# Patient Record
Sex: FEMALE | Race: BLACK OR AFRICAN AMERICAN | NOT HISPANIC OR LATINO | URBAN - METROPOLITAN AREA
[De-identification: names, ages, dates, MRNs, and addresses within clinical notes are randomized per-mention and may not be internally consistent; named-entity substitution may affect disease eponyms.]

---

## 2022-07-15 ENCOUNTER — WEB ENCOUNTER (OUTPATIENT)
Dept: URBAN - METROPOLITAN AREA CLINIC 92 | Facility: CLINIC | Age: 57
End: 2022-07-15

## 2022-07-21 ENCOUNTER — OFFICE VISIT (OUTPATIENT)
Dept: URBAN - METROPOLITAN AREA CLINIC 92 | Facility: CLINIC | Age: 57
End: 2022-07-21
Payer: COMMERCIAL

## 2022-07-21 VITALS
BODY MASS INDEX: 30.8 KG/M2 | WEIGHT: 220 LBS | HEIGHT: 71 IN | SYSTOLIC BLOOD PRESSURE: 149 MMHG | HEART RATE: 96 BPM | DIASTOLIC BLOOD PRESSURE: 97 MMHG | TEMPERATURE: 97.1 F

## 2022-07-21 DIAGNOSIS — I69.30 HISTORY OF CVA WITH RESIDUAL DEFICIT: ICD-10-CM

## 2022-07-21 DIAGNOSIS — R13.19 ESOPHAGEAL DYSPHAGIA: ICD-10-CM

## 2022-07-21 DIAGNOSIS — Z12.11 COLON CANCER SCREENING: ICD-10-CM

## 2022-07-21 DIAGNOSIS — K21.9 GERD WITHOUT ESOPHAGITIS: ICD-10-CM

## 2022-07-21 PROBLEM — 440140008: Status: ACTIVE | Noted: 2022-07-21

## 2022-07-21 PROCEDURE — 99204 OFFICE O/P NEW MOD 45 MIN: CPT | Performed by: INTERNAL MEDICINE

## 2022-07-21 RX ORDER — GABAPENTIN 300 MG/1
CAPSULE ORAL
Qty: 0 | Refills: 0 | Status: ON HOLD | COMMUNITY
Start: 1900-01-01

## 2022-07-21 RX ORDER — PRAVASTATIN SODIUM 40 MG/1
TABLET ORAL
Qty: 0 | Refills: 0 | Status: ACTIVE | COMMUNITY
Start: 1900-01-01

## 2022-07-21 RX ORDER — FAMOTIDINE 40 MG/1
1 TABLET AT BEDTIME TABLET, FILM COATED ORAL ONCE A DAY
Qty: 30 | Refills: 2 | OUTPATIENT
Start: 2022-07-21

## 2022-07-21 RX ORDER — DOXAZOSIN MESYLATE 4 MG/1
1 TABLET TABLET ORAL ONCE A DAY
Status: ACTIVE | COMMUNITY

## 2022-07-21 RX ORDER — PROCHLORPERAZINE 25 MG/1
_INSERT 1 SUPPOSITORY (25 MG) BY RECTAL ROUTE 2 TIMES PER DAY SUPPOSITORY RECTAL 2
Qty: 0 | Refills: 0 | Status: ON HOLD | COMMUNITY
Start: 1900-01-01

## 2022-07-21 RX ORDER — SODIUM, POTASSIUM,MAG SULFATES 17.5-3.13G
354 ML SOLUTION, RECONSTITUTED, ORAL ORAL
Qty: 354 ML | Refills: 0 | OUTPATIENT
Start: 2022-07-21 | End: 2022-07-22

## 2022-07-21 NOTE — HPI-TODAY'S VISIT:
This is a 57 yo referred by Dr. Jose Phillips for a colonoscopy.  A copy of this document will be sent to the referring provider. The patient denies abdominal pain, weight loss, rectal bleeding, constipation, diarrhea, and a change in bowel habits.  There is no family history of colon cancer or colon polyps.  Patient is on plavix for  CVA 2010.  The patient admits to GERD symptoms a few times a week controlled with peptobismol.  She is not on a PPI.  She reports occasional dysphagia to solids.   She also admits to nausea and early satiety but denies vomiting.  She denies weight loss.

## 2022-09-23 ENCOUNTER — OUT OF OFFICE VISIT (OUTPATIENT)
Dept: URBAN - METROPOLITAN AREA MEDICAL CENTER 18 | Facility: MEDICAL CENTER | Age: 57
End: 2022-09-23
Payer: COMMERCIAL

## 2022-09-23 DIAGNOSIS — K83.8 ACQUIRED DILATION OF BILE DUCT: ICD-10-CM

## 2022-09-23 DIAGNOSIS — Z90.49 ABSENCE OF GALLBLADDER: ICD-10-CM

## 2022-09-23 DIAGNOSIS — R10.84 ABDOMINAL CRAMPING, GENERALIZED: ICD-10-CM

## 2022-09-23 DIAGNOSIS — K91.89 BILE LEAK, POSTOPERATIVE: ICD-10-CM

## 2022-09-23 DIAGNOSIS — K91.89 AFFERENT LOOP SYNDROME: ICD-10-CM

## 2022-09-23 PROCEDURE — 99232 SBSQ HOSP IP/OBS MODERATE 35: CPT | Performed by: INTERNAL MEDICINE

## 2022-09-23 PROCEDURE — 99222 1ST HOSP IP/OBS MODERATE 55: CPT | Performed by: INTERNAL MEDICINE

## 2022-09-23 PROCEDURE — 43274 ERCP DUCT STENT PLACEMENT: CPT | Performed by: INTERNAL MEDICINE

## 2022-09-23 PROCEDURE — G8427 DOCREV CUR MEDS BY ELIG CLIN: HCPCS | Performed by: INTERNAL MEDICINE

## 2022-09-25 ENCOUNTER — OUT OF OFFICE VISIT (OUTPATIENT)
Dept: URBAN - METROPOLITAN AREA MEDICAL CENTER 18 | Facility: MEDICAL CENTER | Age: 57
End: 2022-09-25
Payer: COMMERCIAL

## 2022-09-25 DIAGNOSIS — Z90.49 ABSENCE OF GALLBLADDER: ICD-10-CM

## 2022-09-25 DIAGNOSIS — K83.8 ACQUIRED DILATION OF BILE DUCT: ICD-10-CM

## 2022-09-25 DIAGNOSIS — R10.84 ABDOMINAL CRAMPING, GENERALIZED: ICD-10-CM

## 2022-09-25 DIAGNOSIS — K91.89 AFFERENT LOOP SYNDROME: ICD-10-CM

## 2022-09-25 PROCEDURE — 99232 SBSQ HOSP IP/OBS MODERATE 35: CPT | Performed by: PHYSICIAN ASSISTANT

## 2022-09-27 ENCOUNTER — OUT OF OFFICE VISIT (OUTPATIENT)
Dept: URBAN - METROPOLITAN AREA MEDICAL CENTER 18 | Facility: MEDICAL CENTER | Age: 57
End: 2022-09-27
Payer: COMMERCIAL

## 2022-09-27 DIAGNOSIS — K83.8 ACQUIRED DILATION OF BILE DUCT: ICD-10-CM

## 2022-09-27 DIAGNOSIS — R10.84 ABDOMINAL CRAMPING, GENERALIZED: ICD-10-CM

## 2022-09-27 PROCEDURE — 99232 SBSQ HOSP IP/OBS MODERATE 35: CPT | Performed by: PHYSICIAN ASSISTANT

## 2022-10-01 ENCOUNTER — OUT OF OFFICE VISIT (OUTPATIENT)
Dept: URBAN - METROPOLITAN AREA MEDICAL CENTER 18 | Facility: MEDICAL CENTER | Age: 57
End: 2022-10-01
Payer: COMMERCIAL

## 2022-10-01 DIAGNOSIS — K91.89 AFFERENT LOOP SYNDROME: ICD-10-CM

## 2022-10-01 DIAGNOSIS — K83.8 ACQUIRED DILATION OF BILE DUCT: ICD-10-CM

## 2022-10-01 PROCEDURE — 99232 SBSQ HOSP IP/OBS MODERATE 35: CPT | Performed by: INTERNAL MEDICINE

## 2022-10-01 PROCEDURE — 99231 SBSQ HOSP IP/OBS SF/LOW 25: CPT | Performed by: INTERNAL MEDICINE

## 2022-10-10 ENCOUNTER — OUT OF OFFICE VISIT (OUTPATIENT)
Dept: URBAN - METROPOLITAN AREA MEDICAL CENTER 18 | Facility: MEDICAL CENTER | Age: 57
End: 2022-10-10
Payer: COMMERCIAL

## 2022-10-10 DIAGNOSIS — K83.8 ACQUIRED DILATION OF BILE DUCT: ICD-10-CM

## 2022-10-10 DIAGNOSIS — Z90.49 ABSENCE OF GALLBLADDER: ICD-10-CM

## 2022-10-10 PROCEDURE — 99232 SBSQ HOSP IP/OBS MODERATE 35: CPT | Performed by: PHYSICIAN ASSISTANT

## 2022-10-20 ENCOUNTER — P2P PATIENT RECORD (OUTPATIENT)
Age: 57
End: 2022-10-20

## 2022-10-21 ENCOUNTER — TELEPHONE ENCOUNTER (OUTPATIENT)
Dept: URBAN - METROPOLITAN AREA CLINIC 92 | Facility: CLINIC | Age: 57
End: 2022-10-21

## 2022-10-24 ENCOUNTER — TELEPHONE ENCOUNTER (OUTPATIENT)
Dept: URBAN - METROPOLITAN AREA CLINIC 128 | Facility: CLINIC | Age: 57
End: 2022-10-24

## 2022-11-03 ENCOUNTER — OFFICE VISIT (OUTPATIENT)
Dept: URBAN - METROPOLITAN AREA SURGERY CENTER 16 | Facility: SURGERY CENTER | Age: 57
End: 2022-11-03

## 2022-11-22 ENCOUNTER — P2P PATIENT RECORD (OUTPATIENT)
Age: 57
End: 2022-11-22

## 2022-11-22 ENCOUNTER — TELEPHONE ENCOUNTER (OUTPATIENT)
Dept: URBAN - METROPOLITAN AREA CLINIC 92 | Facility: CLINIC | Age: 57
End: 2022-11-22

## 2022-12-02 ENCOUNTER — TELEPHONE ENCOUNTER (OUTPATIENT)
Dept: URBAN - METROPOLITAN AREA CLINIC 92 | Facility: CLINIC | Age: 57
End: 2022-12-02

## 2022-12-05 ENCOUNTER — TELEPHONE ENCOUNTER (OUTPATIENT)
Dept: URBAN - METROPOLITAN AREA CLINIC 128 | Facility: CLINIC | Age: 57
End: 2022-12-05

## 2022-12-07 ENCOUNTER — OFFICE VISIT (OUTPATIENT)
Dept: URBAN - METROPOLITAN AREA CLINIC 128 | Facility: CLINIC | Age: 57
End: 2022-12-07
Payer: COMMERCIAL

## 2022-12-07 ENCOUNTER — WEB ENCOUNTER (OUTPATIENT)
Dept: URBAN - METROPOLITAN AREA CLINIC 128 | Facility: CLINIC | Age: 57
End: 2022-12-07

## 2022-12-07 VITALS
HEART RATE: 89 BPM | HEIGHT: 71 IN | DIASTOLIC BLOOD PRESSURE: 88 MMHG | BODY MASS INDEX: 26.88 KG/M2 | WEIGHT: 192 LBS | SYSTOLIC BLOOD PRESSURE: 139 MMHG | TEMPERATURE: 97.7 F

## 2022-12-07 DIAGNOSIS — K83.8 BILE DUCT LEAK: ICD-10-CM

## 2022-12-07 LAB
A/G RATIO: 1.5
ALBUMIN: 4.1
ALKALINE PHOSPHATASE: 84
ALT (SGPT): 13
AST (SGOT): 11
BILIRUBIN, TOTAL: <0.2
BUN/CREATININE RATIO: 9
BUN: 6
CALCIUM: 9
CARBON DIOXIDE, TOTAL: 19
CHLORIDE: 110
CREATININE: 0.66
EGFR: 103
GLOBULIN, TOTAL: 2.7
GLUCOSE: 74
POTASSIUM: 3.9
PROTEIN, TOTAL: 6.8
SODIUM: 141

## 2022-12-07 PROCEDURE — 99214 OFFICE O/P EST MOD 30 MIN: CPT | Performed by: INTERNAL MEDICINE

## 2022-12-07 RX ORDER — PRAVASTATIN SODIUM 40 MG/1
TABLET ORAL
Qty: 0 | Refills: 0 | Status: DISCONTINUED | COMMUNITY
Start: 1900-01-01

## 2022-12-07 RX ORDER — PROCHLORPERAZINE 25 MG/1
_INSERT 1 SUPPOSITORY (25 MG) BY RECTAL ROUTE 2 TIMES PER DAY SUPPOSITORY RECTAL 2
Qty: 0 | Refills: 0 | Status: DISCONTINUED | COMMUNITY
Start: 1900-01-01

## 2022-12-07 RX ORDER — FAMOTIDINE 40 MG/1
1 TABLET AT BEDTIME TABLET, FILM COATED ORAL ONCE A DAY
Qty: 30 | Refills: 2 | Status: DISCONTINUED | COMMUNITY
Start: 2022-07-21

## 2022-12-07 RX ORDER — DOXAZOSIN MESYLATE 4 MG/1
1 TABLET TABLET ORAL ONCE A DAY
Status: DISCONTINUED | COMMUNITY

## 2022-12-07 RX ORDER — GABAPENTIN 300 MG/1
CAPSULE ORAL
Qty: 0 | Refills: 0 | Status: DISCONTINUED | COMMUNITY
Start: 1900-01-01

## 2022-12-07 NOTE — HPI-TODAY'S VISIT:
seen in september for bile duct injury during ccy. stent and sphincterotmy placed. reports intermittent abd pain. seen in [facility] ER november, labs unremarkable ct showing drains removed with resolution of fluid collections, biliary duct dialtion with pneumobilia and plast stent, o/w pema. labs yesterday unreamrkable. on eliquis.

## 2022-12-07 NOTE — PHYSICAL EXAM GASTROINTESTINAL
Abdomen: soft mild epigast tenderneess.non-distended. No hepatosplenomegaly. no guarding or rebound. Rectal: defered.

## 2022-12-15 ENCOUNTER — TELEPHONE ENCOUNTER (OUTPATIENT)
Dept: URBAN - METROPOLITAN AREA CLINIC 128 | Facility: CLINIC | Age: 57
End: 2022-12-15

## 2022-12-19 PROBLEM — 235932003: Status: ACTIVE | Noted: 2022-12-07

## 2022-12-23 ENCOUNTER — WEB ENCOUNTER (OUTPATIENT)
Dept: URBAN - METROPOLITAN AREA CLINIC 92 | Facility: CLINIC | Age: 57
End: 2022-12-23

## 2022-12-23 ENCOUNTER — TELEPHONE ENCOUNTER (OUTPATIENT)
Dept: URBAN - METROPOLITAN AREA CLINIC 128 | Facility: CLINIC | Age: 57
End: 2022-12-23

## 2022-12-29 ENCOUNTER — OFFICE VISIT (OUTPATIENT)
Dept: URBAN - METROPOLITAN AREA CLINIC 92 | Facility: CLINIC | Age: 57
End: 2022-12-29
Payer: COMMERCIAL

## 2022-12-29 VITALS
HEIGHT: 71 IN | WEIGHT: 198 LBS | HEART RATE: 94 BPM | DIASTOLIC BLOOD PRESSURE: 95 MMHG | BODY MASS INDEX: 27.72 KG/M2 | SYSTOLIC BLOOD PRESSURE: 150 MMHG | TEMPERATURE: 97.1 F

## 2022-12-29 DIAGNOSIS — Z86.711 HISTORY OF PULMONARY ARTERY THROMBOSIS: ICD-10-CM

## 2022-12-29 DIAGNOSIS — K21.9 GERD: ICD-10-CM

## 2022-12-29 DIAGNOSIS — Z12.11 COLON CANCER SCREENING: ICD-10-CM

## 2022-12-29 DIAGNOSIS — R13.10 DYSPHAGIA: ICD-10-CM

## 2022-12-29 PROBLEM — 16833281000119106: Status: ACTIVE | Noted: 2022-12-29

## 2022-12-29 PROCEDURE — 99214 OFFICE O/P EST MOD 30 MIN: CPT | Performed by: INTERNAL MEDICINE

## 2022-12-29 RX ORDER — SODIUM PICOSULFATE, MAGNESIUM OXIDE, AND ANHYDROUS CITRIC ACID 10; 3.5; 12 MG/160ML; G/160ML; G/160ML
AS DIRECTED LIQUID ORAL
OUTPATIENT
Start: 2022-12-29

## 2022-12-29 NOTE — HPI-OTHER HISTORIES
(July 2022) This is a 55 yo referred by Dr. Jose Phillips for a colonoscopy.  A copy of this document will be sent to the referring provider. The patient denies abdominal pain, weight loss, rectal bleeding, constipation, diarrhea, and a change in bowel habits.  There is no family history of colon cancer or colon polyps.  Patient is on plavix for  CVA 2010.  The patient admits to GERD symptoms a few times a week controlled with peptobismol.  She is not on a PPI.  She reports occasional dysphagia to solids.   She also admits to nausea and early satiety but denies vomiting.  She denies weight loss.  ([place]:  December 2022) seen in september for bile duct injury during ccy. stent and sphincterotmy placed. reports intermittent abd pain. seen in [facility] ER november, labs unremarkable ct showing drains removed with resolution of fluid collections, biliary duct dialtion with pneumobilia and plast stent, o/w pema. labs yesterday unreamrkable. on eliquis.

## 2022-12-29 NOTE — HPI-TODAY'S VISIT:
This is a 58 yo referred by Dr. Jose Phillpis for a colonoscopy.  A copy of this document will be sent to the referring provider. The patient denies abdominal pain, weight loss, rectal bleeding, constipation, diarrhea, and a change in bowel habits.  There is no family history of colon cancer or colon polyps.  Patient is on plavix for  CVA 2010.  The patient admits to GERD symptoms a few times a week controlled with peptobismol.  She is not on a PPI.  She reports occasional dysphagia to solids.   She also admits to nausea and early satiety but denies vomiting.  She denies weight loss.

## 2022-12-29 NOTE — PHYSICAL EXAM RECTAL:
normal tone, no external hemorrhoids, no masses palpable, no red blood, Tenderness on KIRBY, Internal hemorrhoids present

## 2023-01-03 ENCOUNTER — TELEPHONE ENCOUNTER (OUTPATIENT)
Dept: URBAN - METROPOLITAN AREA CLINIC 128 | Facility: CLINIC | Age: 58
End: 2023-01-03

## 2023-01-05 ENCOUNTER — LAB OUTSIDE AN ENCOUNTER (OUTPATIENT)
Dept: URBAN - METROPOLITAN AREA CLINIC 92 | Facility: CLINIC | Age: 58
End: 2023-01-05

## 2023-01-06 ENCOUNTER — OFFICE VISIT (OUTPATIENT)
Dept: URBAN - METROPOLITAN AREA MEDICAL CENTER 18 | Facility: MEDICAL CENTER | Age: 58
End: 2023-01-06
Payer: COMMERCIAL

## 2023-01-06 DIAGNOSIS — Z46.59 ENCOUNTER FOR CARE RELATED TO FEEDING TUBE: ICD-10-CM

## 2023-01-06 PROCEDURE — 43275 ERCP REMOVE FORGN BODY DUCT: CPT | Performed by: INTERNAL MEDICINE

## 2023-01-26 PROBLEM — 40739000 DYSPHAGIA: Status: ACTIVE | Noted: 2022-12-29

## 2023-01-26 PROBLEM — 266435005: Status: ACTIVE | Noted: 2022-07-21

## 2023-01-26 PROBLEM — 275978004 COLON CANCER SCREENING: Status: ACTIVE | Noted: 2023-01-26

## 2023-02-02 ENCOUNTER — OFFICE VISIT (OUTPATIENT)
Dept: URBAN - METROPOLITAN AREA CLINIC 128 | Facility: CLINIC | Age: 58
End: 2023-02-02

## 2023-02-27 ENCOUNTER — TELEPHONE ENCOUNTER (OUTPATIENT)
Dept: URBAN - METROPOLITAN AREA CLINIC 92 | Facility: CLINIC | Age: 58
End: 2023-02-27

## 2023-02-27 RX ORDER — SODIUM PICOSULFATE, MAGNESIUM OXIDE, AND ANHYDROUS CITRIC ACID 10; 3.5; 12 MG/160ML; G/160ML; G/160ML
AS DIRECTED LIQUID ORAL
Start: 2022-12-29

## 2023-03-01 ENCOUNTER — TELEPHONE ENCOUNTER (OUTPATIENT)
Dept: URBAN - METROPOLITAN AREA CLINIC 92 | Facility: CLINIC | Age: 58
End: 2023-03-01

## 2023-03-01 RX ORDER — POLYETHYLENE GLYCOL 3350, SODIUM CHLORIDE, SODIUM BICARBONATE, POTASSIUM CHLORIDE 420; 11.2; 5.72; 1.48 G/4L; G/4L; G/4L; G/4L
AS DIRECTED POWDER, FOR SOLUTION ORAL
OUTPATIENT
Start: 2023-03-02

## 2023-03-01 RX ORDER — SODIUM PICOSULFATE, MAGNESIUM OXIDE, AND ANHYDROUS CITRIC ACID 10; 3.5; 12 MG/160ML; G/160ML; G/160ML
AS DIRECTED LIQUID ORAL
OUTPATIENT
Start: 2022-12-29

## 2023-03-03 ENCOUNTER — TELEPHONE ENCOUNTER (OUTPATIENT)
Dept: URBAN - METROPOLITAN AREA CLINIC 92 | Facility: CLINIC | Age: 58
End: 2023-03-03

## 2023-03-06 ENCOUNTER — OFFICE VISIT (OUTPATIENT)
Dept: URBAN - METROPOLITAN AREA MEDICAL CENTER 12 | Facility: MEDICAL CENTER | Age: 58
End: 2023-03-06
Payer: COMMERCIAL

## 2023-03-06 DIAGNOSIS — K31.89 ACQUIRED DEFORMITY OF DUODENUM: ICD-10-CM

## 2023-03-06 DIAGNOSIS — R13.19 CERVICAL DYSPHAGIA: ICD-10-CM

## 2023-03-06 DIAGNOSIS — Z86.010 ADENOMAS PERSONAL HISTORY OF COLONIC POLYPS: ICD-10-CM

## 2023-03-06 PROCEDURE — 43450 DILATE ESOPHAGUS 1/MULT PASS: CPT | Performed by: INTERNAL MEDICINE

## 2023-03-06 PROCEDURE — 45378 DIAGNOSTIC COLONOSCOPY: CPT | Performed by: INTERNAL MEDICINE

## 2023-03-06 PROCEDURE — 43239 EGD BIOPSY SINGLE/MULTIPLE: CPT | Performed by: INTERNAL MEDICINE

## 2023-03-06 RX ORDER — POLYETHYLENE GLYCOL 3350, SODIUM CHLORIDE, SODIUM BICARBONATE, POTASSIUM CHLORIDE 420; 11.2; 5.72; 1.48 G/4L; G/4L; G/4L; G/4L
AS DIRECTED POWDER, FOR SOLUTION ORAL
Status: ACTIVE | COMMUNITY
Start: 2023-03-02

## 2023-03-09 ENCOUNTER — TELEPHONE ENCOUNTER (OUTPATIENT)
Dept: URBAN - METROPOLITAN AREA CLINIC 92 | Facility: CLINIC | Age: 58
End: 2023-03-09

## 2023-03-14 ENCOUNTER — LAB OUTSIDE AN ENCOUNTER (OUTPATIENT)
Dept: URBAN - METROPOLITAN AREA CLINIC 128 | Facility: CLINIC | Age: 58
End: 2023-03-14

## 2023-03-21 LAB
ADENOVIRUS F 40/41: NOT DETECTED
CALPROTECTIN, STOOL - QDX: (no result)
CAMPYLOBACTER: NOT DETECTED
CLOSTRIDIUM DIFFICILE: NOT DETECTED
CRYPTOSPORIDIUM: NOT DETECTED
CYCLOSPORA CAYETANESIS: NOT DETECTED
E. COLI O157: (no result)
ENTAMOEBA HISTOLYTICA: NOT DETECTED
ENTAMOEBA HISTOLYTICA: NOT DETECTED
ENTEROAGGREGATIVE E.COLI: NOT DETECTED
ENTEROTOXIGENIC E.COLI: NOT DETECTED
ESCHERICHIA COLI O157: NOT DETECTED
GIARDIA LAMBIA: NOT DETECTED
NOROVIRUS GI/GII: NOT DETECTED
NOROVIRUS GI/GII: NOT DETECTED
PANCREATICELASTASE ELISA, STOOL: (no result)
ROTAVIRUS A: NOT DETECTED
SHIGA-LIKE TOXIN PRODUCING E.COLI: NOT DETECTED
SHIGELLA SPP. / ENTEROINVASIVE E.COLI: NOT DETECTED
VIBRIO CHOLERAE: NOT DETECTED
VIBRIO PARAHAEMOLYTICUS: NOT DETECTED
VIBRIO SPP.: NOT DETECTED
YERSINIA ENTEROCOLITICA: NOT DETECTED
YERSINIA ENTEROCOLITICA: NOT DETECTED

## 2023-04-11 ENCOUNTER — TELEPHONE ENCOUNTER (OUTPATIENT)
Dept: URBAN - METROPOLITAN AREA CLINIC 92 | Facility: CLINIC | Age: 58
End: 2023-04-11

## 2023-04-11 RX ORDER — SCOPOLAMINE 1 MG/3D
1 PATCH TO SKIN BEHIND THE EAR AS NEEDED PATCH TRANSDERMAL
Qty: 9 | OUTPATIENT
Start: 2023-04-13 | End: 2023-05-13

## 2023-04-26 ENCOUNTER — OFFICE VISIT (OUTPATIENT)
Dept: URBAN - METROPOLITAN AREA TELEHEALTH 2 | Facility: TELEHEALTH | Age: 58
End: 2023-04-26

## 2023-04-26 ENCOUNTER — CLAIMS CREATED FROM THE CLAIM WINDOW (OUTPATIENT)
Dept: URBAN - METROPOLITAN AREA TELEHEALTH 2 | Facility: TELEHEALTH | Age: 58
End: 2023-04-26
Payer: COMMERCIAL

## 2023-04-26 ENCOUNTER — CLAIMS CREATED FROM THE CLAIM WINDOW (OUTPATIENT)
Dept: URBAN - METROPOLITAN AREA TELEHEALTH 2 | Facility: TELEHEALTH | Age: 58
End: 2023-04-26

## 2023-04-26 ENCOUNTER — TELEPHONE ENCOUNTER (OUTPATIENT)
Dept: URBAN - METROPOLITAN AREA CLINIC 35 | Facility: CLINIC | Age: 58
End: 2023-04-26

## 2023-04-26 VITALS — WEIGHT: 196 LBS | BODY MASS INDEX: 27.44 KG/M2 | HEIGHT: 71 IN

## 2023-04-26 DIAGNOSIS — K21.9 GERD: ICD-10-CM

## 2023-04-26 DIAGNOSIS — R11.2 NAUSEA AND VOMITING, UNSPECIFIED VOMITING TYPE: ICD-10-CM

## 2023-04-26 DIAGNOSIS — R19.7 DIARRHEA, UNSPECIFIED TYPE: ICD-10-CM

## 2023-04-26 DIAGNOSIS — K86.89 PANCREATIC INSUFFICIENCY: ICD-10-CM

## 2023-04-26 DIAGNOSIS — R13.10 DYSPHAGIA: ICD-10-CM

## 2023-04-26 PROCEDURE — 99213 OFFICE O/P EST LOW 20 MIN: CPT | Performed by: INTERNAL MEDICINE

## 2023-04-26 RX ORDER — ONDANSETRON 4 MG/1
1 TABLET ON THE TONGUE AND ALLOW TO DISSOLVE TABLET, ORALLY DISINTEGRATING ORAL ONCE A DAY
Qty: 30 | OUTPATIENT
Start: 2023-04-26

## 2023-04-26 RX ORDER — PANCRELIPASE 36000; 180000; 114000 [USP'U]/1; [USP'U]/1; [USP'U]/1
THREE TABLETS WITH MEALS AND ONE TABLET WITH A SNACK CAPSULE, DELAYED RELEASE PELLETS ORAL THREE TIMES A DAY
Qty: 270 | Refills: 2 | OUTPATIENT
Start: 2023-04-26 | End: 2023-07-25

## 2023-04-26 RX ORDER — POLYETHYLENE GLYCOL 3350, SODIUM CHLORIDE, SODIUM BICARBONATE, POTASSIUM CHLORIDE 420; 11.2; 5.72; 1.48 G/4L; G/4L; G/4L; G/4L
AS DIRECTED POWDER, FOR SOLUTION ORAL
Status: ACTIVE | COMMUNITY
Start: 2023-03-02

## 2023-04-26 NOTE — HPI-OTHER HISTORIES
(July 2022) This is a 57 yo referred by Dr. Jose Phillips for a colonoscopy.  A copy of this document will be sent to the referring provider. The patient denies abdominal pain, weight loss, rectal bleeding, constipation, diarrhea, and a change in bowel habits.  There is no family history of colon cancer or colon polyps.  Patient is on plavix for  CVA 2010.  The patient admits to GERD symptoms a few times a week controlled with peptobismol.  She is not on a PPI.  She reports occasional dysphagia to solids.   She also admits to nausea and early satiety but denies vomiting.  She denies weight loss.  ([place]:  December 2022) seen in september for bile duct injury during ccy. stent and sphincterotmy placed. reports intermittent abd pain. seen in [facility] ER november, labs unremarkable ct showing drains removed with resolution of fluid collections, biliary duct dialtion with pneumobilia and plast stent, o/w pema. labs yesterday unreamrkable. on eliquis.  (December 2022) This is a 58 yo referred by Dr. Jose Phillips for a colonoscopy.  A copy of this document will be sent to the referring provider. The patient denies abdominal pain, weight loss, rectal bleeding, constipation, diarrhea, and a change in bowel habits.  There is no family history of colon cancer or colon polyps.  Patient is on plavix for  CVA 2010.  The patient admits to GERD symptoms a few times a week controlled with peptobismol.  She is not on a PPI.  She reports occasional dysphagia to solids.   She also admits to nausea and early satiety but denies vomiting.  She denies weight loss.

## 2023-04-26 NOTE — HPI-TODAY'S VISIT:
This is a 58 yo female here for follow up of an EGD and colonoscopy.  The EGD performed for dysphagia was unremarakble for a stricture.  Dilation was performed using a Coulter 46 and 50 dilator.  Diverticuli and hemorrhoids were noted on colonoscopy but was otherwise normal.  She denies dysphagia.  Vomiting has significantly improved.  Nausea is persistent.  She also reprots cramping which generally precedes a bowel movement and relieved with stool evacuation.  This occurs several times throughout the month.  These symptoms developed after her gallbaldder was removed 2022.  Stool studies were positive for severe pancreatic insufficiency.

## 2023-06-22 ENCOUNTER — OFFICE VISIT (OUTPATIENT)
Dept: URBAN - METROPOLITAN AREA CLINIC 92 | Facility: CLINIC | Age: 58
End: 2023-06-22

## 2023-06-22 RX ORDER — POLYETHYLENE GLYCOL 3350, SODIUM CHLORIDE, SODIUM BICARBONATE, POTASSIUM CHLORIDE 420; 11.2; 5.72; 1.48 G/4L; G/4L; G/4L; G/4L
AS DIRECTED POWDER, FOR SOLUTION ORAL
COMMUNITY
Start: 2023-03-02

## 2023-06-22 RX ORDER — ONDANSETRON 4 MG/1
1 TABLET ON THE TONGUE AND ALLOW TO DISSOLVE TABLET, ORALLY DISINTEGRATING ORAL ONCE A DAY
Qty: 30 | COMMUNITY
Start: 2023-04-26

## 2023-06-22 RX ORDER — PANCRELIPASE 36000; 180000; 114000 [USP'U]/1; [USP'U]/1; [USP'U]/1
THREE TABLETS WITH MEALS AND ONE TABLET WITH A SNACK CAPSULE, DELAYED RELEASE PELLETS ORAL THREE TIMES A DAY
Qty: 270 | Refills: 2 | COMMUNITY
Start: 2023-04-26 | End: 2023-07-25

## 2023-07-10 ENCOUNTER — TELEPHONE ENCOUNTER (OUTPATIENT)
Dept: URBAN - METROPOLITAN AREA TELEHEALTH 2 | Facility: TELEHEALTH | Age: 58
End: 2023-07-10

## 2023-07-21 ENCOUNTER — TELEPHONE ENCOUNTER (OUTPATIENT)
Dept: URBAN - METROPOLITAN AREA CLINIC 17 | Facility: CLINIC | Age: 58
End: 2023-07-21

## 2023-07-21 PROBLEM — 62484002: Status: ACTIVE | Noted: 2023-07-21

## 2023-08-17 ENCOUNTER — OFFICE VISIT (OUTPATIENT)
Dept: URBAN - METROPOLITAN AREA CLINIC 92 | Facility: CLINIC | Age: 58
End: 2023-08-17
Payer: COMMERCIAL

## 2023-08-17 ENCOUNTER — LAB OUTSIDE AN ENCOUNTER (OUTPATIENT)
Dept: URBAN - METROPOLITAN AREA CLINIC 92 | Facility: CLINIC | Age: 58
End: 2023-08-17

## 2023-08-17 VITALS
WEIGHT: 227 LBS | SYSTOLIC BLOOD PRESSURE: 129 MMHG | BODY MASS INDEX: 31.78 KG/M2 | HEART RATE: 74 BPM | DIASTOLIC BLOOD PRESSURE: 85 MMHG | TEMPERATURE: 97.1 F | HEIGHT: 71 IN

## 2023-08-17 DIAGNOSIS — R93.5 ABNORMAL CT OF THE ABDOMEN: ICD-10-CM

## 2023-08-17 DIAGNOSIS — R15.1 FECAL SMEARING: ICD-10-CM

## 2023-08-17 DIAGNOSIS — K76.0 FATTY LIVER: ICD-10-CM

## 2023-08-17 PROBLEM — 197321007: Status: ACTIVE | Noted: 2023-08-17

## 2023-08-17 PROCEDURE — 99204 OFFICE O/P NEW MOD 45 MIN: CPT | Performed by: INTERNAL MEDICINE

## 2023-08-17 PROCEDURE — 99214 OFFICE O/P EST MOD 30 MIN: CPT | Performed by: INTERNAL MEDICINE

## 2023-08-17 RX ORDER — POLYETHYLENE GLYCOL 3350, SODIUM CHLORIDE, SODIUM BICARBONATE, POTASSIUM CHLORIDE 420; 11.2; 5.72; 1.48 G/4L; G/4L; G/4L; G/4L
AS DIRECTED POWDER, FOR SOLUTION ORAL
COMMUNITY
Start: 2023-03-02

## 2023-08-17 RX ORDER — ONDANSETRON 4 MG/1
1 TABLET ON THE TONGUE AND ALLOW TO DISSOLVE TABLET, ORALLY DISINTEGRATING ORAL ONCE A DAY
Qty: 30 | COMMUNITY
Start: 2023-04-26

## 2023-08-17 NOTE — HPI-TODAY'S VISIT:
This is a  59 yo female with a PMHx of pancreatic insufficiency.  Diarrhea symptoms have improved with Creon.  She now only has 3 loose episodes per month.    Today she reports fecal smearing which developed one month.  Symptoms occur a few times a month.   A colonoscopy and EGD were performed March 2023.  The colonoscopy demonstrated tic and IH  She is also here for follow up of a CT scan performed for abdominal pain.  The exam July 2023 demonstrated a nodular liver and steatosis.

## 2023-08-22 LAB
ALBUMIN/GLOBULIN RATIO: 1.5
ALBUMIN: 4.3
ALKALINE PHOSPHATASE: 85
ALT: 14
ANA SCREEN, IFA: NEGATIVE
AST: 14
BILIRUBIN, TOTAL: 0.2
BUN/CREATININE RATIO: (no result)
CALCIUM: 9
CARBON DIOXIDE: 23
CHLORIDE: 107
CREATININE: 0.79
GLOBULIN: 2.8
GLUCOSE: 85
HEMATOCRIT: 37.8
HEMOGLOBIN: 12.4
HEPATITIS B CORE AB TOTAL: (no result)
HEPATITIS B SURFACE ANTIGEN: (no result)
HEPATITIS C ANTIBODY: (no result)
MCH: 29.6
MCHC: 32.8
MCV: 90.2
MITOCHONDRIAL (M2) ANTIBODY: <=20
MPV: 10.6
PLATELET COUNT: 235
POTASSIUM: 4.2
PROTEIN, TOTAL: 7.1
RDW: 11.8
RED BLOOD CELL COUNT: 4.19
SMOOTH MUSCLE AB SCREEN: NEGATIVE
SODIUM: 139
UREA NITROGEN (BUN): 15
WHITE BLOOD CELL COUNT: 3.8

## 2023-09-06 ENCOUNTER — TELEPHONE ENCOUNTER (OUTPATIENT)
Dept: URBAN - METROPOLITAN AREA CLINIC 92 | Facility: CLINIC | Age: 58
End: 2023-09-06

## 2023-09-06 RX ORDER — PANCRELIPASE 36000; 180000; 114000 [USP'U]/1; [USP'U]/1; [USP'U]/1
3 TABLETS WITH MEALS CAPSULE, DELAYED RELEASE PELLETS ORAL THREE TIMES A DAY
Qty: 810 | Refills: 3

## 2023-09-22 ENCOUNTER — TELEPHONE ENCOUNTER (OUTPATIENT)
Dept: URBAN - METROPOLITAN AREA CLINIC 92 | Facility: CLINIC | Age: 58
End: 2023-09-22

## 2024-05-14 ENCOUNTER — OFFICE VISIT (OUTPATIENT)
Dept: URBAN - METROPOLITAN AREA CLINIC 17 | Facility: CLINIC | Age: 59
End: 2024-05-14
Payer: COMMERCIAL

## 2024-05-14 ENCOUNTER — DASHBOARD ENCOUNTERS (OUTPATIENT)
Age: 59
End: 2024-05-14

## 2024-05-14 VITALS
WEIGHT: 240 LBS | HEART RATE: 80 BPM | TEMPERATURE: 97.2 F | DIASTOLIC BLOOD PRESSURE: 91 MMHG | SYSTOLIC BLOOD PRESSURE: 157 MMHG | BODY MASS INDEX: 33.6 KG/M2 | HEIGHT: 71 IN

## 2024-05-14 DIAGNOSIS — R11.2 NAUSEA AND VOMITING, UNSPECIFIED VOMITING TYPE: ICD-10-CM

## 2024-05-14 DIAGNOSIS — R15.1 FECAL SMEARING: ICD-10-CM

## 2024-05-14 DIAGNOSIS — Z86.711 HISTORY OF PULMONARY ARTERY THROMBOSIS: ICD-10-CM

## 2024-05-14 DIAGNOSIS — R93.5 ABNORMAL CT OF THE ABDOMEN: ICD-10-CM

## 2024-05-14 DIAGNOSIS — K86.89 PANCREATIC INSUFFICIENCY: ICD-10-CM

## 2024-05-14 DIAGNOSIS — K21.9 GERD: ICD-10-CM

## 2024-05-14 DIAGNOSIS — K76.0 FATTY LIVER: ICD-10-CM

## 2024-05-14 PROCEDURE — 99214 OFFICE O/P EST MOD 30 MIN: CPT | Performed by: INTERNAL MEDICINE

## 2024-05-14 RX ORDER — SUCRALFATE 1 G/1
1 TABLET ON AN EMPTY STOMACH TABLET ORAL TWICE A DAY
OUTPATIENT

## 2024-05-14 RX ORDER — METOCLOPRAMIDE 10 MG/1
1 TABLET BEFORE MEALS TABLET ORAL TWICE A DAY
Status: ACTIVE | COMMUNITY

## 2024-05-14 RX ORDER — PROMETHAZINE HYDROCHLORIDE 25 MG/1
1 TABLET AS NEEDED TABLET ORAL EVERY 24 HOURS
Qty: 30 | Refills: 3 | Status: ACTIVE | COMMUNITY
Start: 2024-04-12 | End: 2024-08-10

## 2024-05-14 RX ORDER — ONDANSETRON 4 MG/1
1 TABLET ON THE TONGUE AND ALLOW TO DISSOLVE TABLET, ORALLY DISINTEGRATING ORAL ONCE A DAY
Qty: 30

## 2024-05-14 RX ORDER — METOCLOPRAMIDE 10 MG/1
1 TABLET BEFORE MEALS TABLET ORAL TWICE A DAY
OUTPATIENT

## 2024-05-14 RX ORDER — FAMOTIDINE 40 MG/1
1 TABLET AT BEDTIME TABLET, FILM COATED ORAL ONCE A DAY
Qty: 30 | Refills: 11 | OUTPATIENT
Start: 2024-05-14

## 2024-05-14 RX ORDER — PANCRELIPASE 36000; 180000; 114000 [USP'U]/1; [USP'U]/1; [USP'U]/1
3 TABLETS WITH MEALS CAPSULE, DELAYED RELEASE PELLETS ORAL THREE TIMES A DAY
Qty: 810 | Refills: 3 | Status: ACTIVE | COMMUNITY

## 2024-05-14 RX ORDER — SUCRALFATE 1 G/1
1 TABLET ON AN EMPTY STOMACH TABLET ORAL TWICE A DAY
Status: ACTIVE | COMMUNITY

## 2024-05-14 RX ORDER — IBUPROFEN 600 MG/1
1 TABLET WITH FOOD OR MILK AS NEEDED TABLET, FILM COATED ORAL THREE TIMES A DAY
Status: ACTIVE | COMMUNITY

## 2024-07-25 ENCOUNTER — OFFICE VISIT (OUTPATIENT)
Dept: URBAN - METROPOLITAN AREA CLINIC 92 | Facility: CLINIC | Age: 59
End: 2024-07-25
Payer: COMMERCIAL

## 2024-07-25 VITALS — SYSTOLIC BLOOD PRESSURE: 120 MMHG | HEIGHT: 71 IN | HEART RATE: 70 BPM | DIASTOLIC BLOOD PRESSURE: 77 MMHG

## 2024-07-25 DIAGNOSIS — K21.9 GERD: ICD-10-CM

## 2024-07-25 DIAGNOSIS — K86.89 PANCREATIC INSUFFICIENCY: ICD-10-CM

## 2024-07-25 DIAGNOSIS — K76.0 FATTY LIVER: ICD-10-CM

## 2024-07-25 DIAGNOSIS — K59.01 SLOW TRANSIT CONSTIPATION: ICD-10-CM

## 2024-07-25 PROBLEM — 35298007: Status: ACTIVE | Noted: 2024-07-25

## 2024-07-25 PROCEDURE — 99213 OFFICE O/P EST LOW 20 MIN: CPT

## 2024-07-25 RX ORDER — ONDANSETRON 4 MG/1
1 TABLET ON THE TONGUE AND ALLOW TO DISSOLVE TABLET, ORALLY DISINTEGRATING ORAL
Qty: 180 | Refills: 1

## 2024-07-25 RX ORDER — FAMOTIDINE 40 MG/1
1 TABLET AT BEDTIME TABLET, FILM COATED ORAL ONCE A DAY
Qty: 30 | Refills: 11 | Status: ACTIVE | COMMUNITY
Start: 2024-05-14

## 2024-07-25 RX ORDER — OMEPRAZOLE 40 MG/1
1 CAPSULE 30 MINUTES BEFORE MORNING MEAL CAPSULE, DELAYED RELEASE ORAL ONCE A DAY
Qty: 90 | Refills: 1 | OUTPATIENT
Start: 2024-07-25

## 2024-07-25 RX ORDER — IBUPROFEN 600 MG/1
1 TABLET WITH FOOD OR MILK AS NEEDED TABLET, FILM COATED ORAL THREE TIMES A DAY
Status: ACTIVE | COMMUNITY

## 2024-07-25 RX ORDER — ONDANSETRON 4 MG/1
1 TABLET ON THE TONGUE AND ALLOW TO DISSOLVE TABLET, ORALLY DISINTEGRATING ORAL ONCE A DAY
Qty: 30 | Status: ACTIVE | COMMUNITY

## 2024-07-25 RX ORDER — PROMETHAZINE HYDROCHLORIDE 25 MG/1
1 TABLET AS NEEDED TABLET ORAL EVERY 24 HOURS
Qty: 30 | Refills: 3 | Status: ACTIVE | COMMUNITY
Start: 2024-04-12 | End: 2024-08-10

## 2024-07-25 NOTE — HPI-TODAY'S VISIT:
Today's visit 7/25/24: Patient presents for a follow up to discuss ongoing nausea, poorly controlled heartburn and now RLQ pain and constipation. She recently saw a GYN and had an ultrasound but they found no abnormalities in her pelvis such as fluid/inflammation or ovarian issues. She has a history of hysterectomy, denies new vaginal bleeding. The constipation is new over the past few weeks. She denies hematochezia or melena. States she now goes every 2 days as opposed to daily and she has to strain to have a BM. No fevers or chills. She has not tried any medications or diet changes. Her nausea is persistent, without vomiting. Initially zofran helped when taken daily prn, now she is noticing less of a difference. She is on famotidine and Creon. Creon keeps her from having severe symptoms but she has noticed no improvement in heartburn with famotidine. Remembers being on pantoprazole in the hospital after her bile leak, but she does not remember if this was helpful. She did have an EGD and colonoscopy in June of 2023 with Dr. Alston. EGD was without abnormalities and she was negative for H pylori at the time.   She denies weight loss, dysphagia, odynophagia, anorexia. Recent labs and imaging done at [place] were wnl- CT A/P from May reviewed and no acute changes were noted.  Last visit 5/14/24: This is a 57 yo female with a PMHx of pancreatic insufficiency here for follow up.   The patient was admitted 24 hours fro observation to [facility] last week.  She had significant vomiting in association with a headache.  Intractible vomiting lasted for about 24 hours.  She was treated with anti emetics..  She had a negative CT of the head, negative EEG.   She was discharged on metoclopromide.   She is on pantoprazole which was placed after her gall bladder was removed.  She presumed she had to continue the medication indefinitely.  She admits to anxiet but has never been treated. About 4 days per month she has acute diarrha which lasts 12 hours.   She is on Creon for CPI.  It helps with abdominal cramping and diarrhea urgency.

## 2024-07-25 NOTE — PHYSICAL EXAM GASTROINTESTINAL
Abdomen , soft, +RLQ pain without guarding or rebound, nondistended , no masses palpable , normal bowel sounds , Liver and Spleen,  no hepatosplenomegaly , liver nontender

## 2024-08-06 ENCOUNTER — OFFICE VISIT (OUTPATIENT)
Dept: URBAN - METROPOLITAN AREA CLINIC 17 | Facility: CLINIC | Age: 59
End: 2024-08-06

## 2024-09-09 ENCOUNTER — TELEPHONE ENCOUNTER (OUTPATIENT)
Dept: URBAN - METROPOLITAN AREA CLINIC 92 | Facility: CLINIC | Age: 59
End: 2024-09-09

## 2024-09-09 RX ORDER — PANCRELIPASE 36000; 180000; 114000 [USP'U]/1; [USP'U]/1; [USP'U]/1
3 TABLETS WITH MEALS CAPSULE, DELAYED RELEASE PELLETS ORAL
Qty: 800 | Refills: 2 | OUTPATIENT
Start: 2024-09-10 | End: 2025-06-07

## 2024-09-12 ENCOUNTER — TELEPHONE ENCOUNTER (OUTPATIENT)
Dept: URBAN - METROPOLITAN AREA CLINIC 92 | Facility: CLINIC | Age: 59
End: 2024-09-12

## 2024-09-12 ENCOUNTER — TELEPHONE ENCOUNTER (OUTPATIENT)
Dept: URBAN - METROPOLITAN AREA CLINIC 78 | Facility: CLINIC | Age: 59
End: 2024-09-12

## 2024-11-21 ENCOUNTER — OFFICE VISIT (OUTPATIENT)
Dept: URBAN - METROPOLITAN AREA CLINIC 92 | Facility: CLINIC | Age: 59
End: 2024-11-21

## 2024-11-21 RX ORDER — IBUPROFEN 600 MG/1
1 TABLET WITH FOOD OR MILK AS NEEDED TABLET, FILM COATED ORAL THREE TIMES A DAY
Status: ACTIVE | COMMUNITY

## 2024-11-21 RX ORDER — FAMOTIDINE 40 MG/1
1 TABLET AT BEDTIME TABLET, FILM COATED ORAL ONCE A DAY
Qty: 30 | Refills: 11 | Status: ACTIVE | COMMUNITY
Start: 2024-05-14

## 2024-11-21 RX ORDER — PANCRELIPASE 36000; 180000; 114000 [USP'U]/1; [USP'U]/1; [USP'U]/1
3 TABLETS WITH MEALS CAPSULE, DELAYED RELEASE PELLETS ORAL
Qty: 800 | Refills: 2 | Status: ACTIVE | COMMUNITY
Start: 2024-09-10 | End: 2025-06-07

## 2024-11-21 RX ORDER — OMEPRAZOLE 40 MG/1
1 CAPSULE 30 MINUTES BEFORE MORNING MEAL CAPSULE, DELAYED RELEASE ORAL ONCE A DAY
Qty: 90 | Refills: 1 | Status: ACTIVE | COMMUNITY
Start: 2024-07-25

## 2024-11-21 RX ORDER — ONDANSETRON 4 MG/1
1 TABLET ON THE TONGUE AND ALLOW TO DISSOLVE TABLET, ORALLY DISINTEGRATING ORAL
Qty: 180 | Refills: 1 | Status: ACTIVE | COMMUNITY

## 2024-12-05 ENCOUNTER — TELEPHONE ENCOUNTER (OUTPATIENT)
Dept: URBAN - METROPOLITAN AREA CLINIC 92 | Facility: CLINIC | Age: 59
End: 2024-12-05

## 2024-12-05 RX ORDER — OMEPRAZOLE 40 MG/1
1 CAPSULE 30 MINUTES BEFORE MORNING MEAL CAPSULE, DELAYED RELEASE ORAL ONCE A DAY
Qty: 90 | Refills: 1
Start: 2024-07-25

## 2025-01-06 ENCOUNTER — OFFICE VISIT (OUTPATIENT)
Dept: URBAN - METROPOLITAN AREA CLINIC 92 | Facility: CLINIC | Age: 60
End: 2025-01-06

## 2025-01-06 RX ORDER — OMEPRAZOLE 40 MG/1
1 CAPSULE 30 MINUTES BEFORE MORNING MEAL CAPSULE, DELAYED RELEASE ORAL ONCE A DAY
Qty: 90 | Refills: 1 | Status: ACTIVE | COMMUNITY
Start: 2024-07-25

## 2025-01-06 RX ORDER — IBUPROFEN 600 MG/1
1 TABLET WITH FOOD OR MILK AS NEEDED TABLET, FILM COATED ORAL THREE TIMES A DAY
Status: ACTIVE | COMMUNITY

## 2025-01-06 RX ORDER — ONDANSETRON 4 MG/1
1 TABLET ON THE TONGUE AND ALLOW TO DISSOLVE TABLET, ORALLY DISINTEGRATING ORAL
Qty: 180 | Refills: 1 | Status: ACTIVE | COMMUNITY

## 2025-01-06 RX ORDER — PANCRELIPASE 36000; 180000; 114000 [USP'U]/1; [USP'U]/1; [USP'U]/1
3 TABLETS WITH MEALS CAPSULE, DELAYED RELEASE PELLETS ORAL
Qty: 800 | Refills: 2 | Status: ACTIVE | COMMUNITY
Start: 2024-09-10 | End: 2025-06-07

## 2025-01-06 RX ORDER — FAMOTIDINE 40 MG/1
1 TABLET AT BEDTIME TABLET, FILM COATED ORAL ONCE A DAY
Qty: 30 | Refills: 11 | Status: ACTIVE | COMMUNITY
Start: 2024-05-14

## 2025-01-27 ENCOUNTER — OFFICE VISIT (OUTPATIENT)
Dept: URBAN - METROPOLITAN AREA CLINIC 92 | Facility: CLINIC | Age: 60
End: 2025-01-27

## 2025-01-29 ENCOUNTER — OFFICE VISIT (OUTPATIENT)
Dept: URBAN - METROPOLITAN AREA CLINIC 92 | Facility: CLINIC | Age: 60
End: 2025-01-29
Payer: COMMERCIAL

## 2025-01-29 ENCOUNTER — TELEPHONE ENCOUNTER (OUTPATIENT)
Dept: URBAN - METROPOLITAN AREA CLINIC 92 | Facility: CLINIC | Age: 60
End: 2025-01-29

## 2025-01-29 VITALS
TEMPERATURE: 97.3 F | HEIGHT: 71 IN | DIASTOLIC BLOOD PRESSURE: 68 MMHG | SYSTOLIC BLOOD PRESSURE: 108 MMHG | BODY MASS INDEX: 34.4 KG/M2 | HEART RATE: 79 BPM | WEIGHT: 245.7 LBS

## 2025-01-29 DIAGNOSIS — K86.89 PANCREATIC INSUFFICIENCY: ICD-10-CM

## 2025-01-29 DIAGNOSIS — K21.9 GERD: ICD-10-CM

## 2025-01-29 DIAGNOSIS — R11.2 NAUSEA AND VOMITING, UNSPECIFIED VOMITING TYPE: ICD-10-CM

## 2025-01-29 DIAGNOSIS — Z86.711 HISTORY OF PULMONARY ARTERY THROMBOSIS: ICD-10-CM

## 2025-01-29 DIAGNOSIS — K59.01 SLOW TRANSIT CONSTIPATION: ICD-10-CM

## 2025-01-29 DIAGNOSIS — K76.0 FATTY LIVER: ICD-10-CM

## 2025-01-29 DIAGNOSIS — R14.0 BLOATING: ICD-10-CM

## 2025-01-29 DIAGNOSIS — R10.31 RLQ DISCOMFORT: ICD-10-CM

## 2025-01-29 PROCEDURE — 99213 OFFICE O/P EST LOW 20 MIN: CPT

## 2025-01-29 RX ORDER — ONDANSETRON 4 MG/1
1 TABLET ON THE TONGUE AND ALLOW TO DISSOLVE TABLET, ORALLY DISINTEGRATING ORAL
Qty: 180 | Refills: 1 | Status: ON HOLD | COMMUNITY

## 2025-01-29 RX ORDER — SCOPOLAMINE 1 MG/3D
1 PATCH TO SKIN BEHIND THE EAR AS NEEDED PATCH TRANSDERMAL
Qty: 10 PATCH | Refills: 1 | OUTPATIENT
Start: 2025-01-29 | End: 2025-03-30

## 2025-01-29 RX ORDER — OMEPRAZOLE 40 MG/1
1 CAPSULE 30 MINUTES BEFORE MORNING MEAL CAPSULE, DELAYED RELEASE ORAL ONCE A DAY
Qty: 90 | Refills: 1 | Status: ACTIVE | COMMUNITY
Start: 2024-07-25

## 2025-01-29 RX ORDER — BUDESONIDE, GLYCOPYRROLATE, AND FORMOTEROL FUMARATE 160; 9; 4.8 UG/1; UG/1; UG/1
2 PUFFS AEROSOL, METERED RESPIRATORY (INHALATION) TWICE A DAY
Status: ACTIVE | COMMUNITY

## 2025-01-29 RX ORDER — IBUPROFEN 600 MG/1
1 TABLET WITH FOOD OR MILK AS NEEDED TABLET, FILM COATED ORAL THREE TIMES A DAY
Status: ON HOLD | COMMUNITY

## 2025-01-29 RX ORDER — PANCRELIPASE 36000; 180000; 114000 [USP'U]/1; [USP'U]/1; [USP'U]/1
3 TABLETS WITH MEALS CAPSULE, DELAYED RELEASE PELLETS ORAL
Qty: 800 | Refills: 2 | Status: ACTIVE | COMMUNITY
Start: 2024-09-10 | End: 2025-06-07

## 2025-01-29 RX ORDER — FAMOTIDINE 40 MG/1
1 TABLET AT BEDTIME TABLET, FILM COATED ORAL ONCE A DAY
Qty: 30 | Refills: 11 | Status: ACTIVE | COMMUNITY
Start: 2024-05-14

## 2025-01-29 NOTE — HPI-TODAY'S VISIT:
Patient is a 59 year old female with a PMH of COPD, DVT, PE, EPI who presents in follow up. Previously seen by EBEN Rausch   5/14/24: This is a 57 yo female with a PMHx of pancreatic insufficiency here for follow up.   The patient was admitted 24 hours fro observation to [facility] [place] last week.  She had significant vomiting in association with a headache.  Intractible vomiting lasted for about 24 hours.  She was treated with anti emetics..  She had a negative CT of the head, negative EEG.   She was discharged on metoclopromide.   She is on pantoprazole which was placed after her gall bladder was removed.  She presumed she had to continue the medication indefinitely.  She admits to anxiet but has never been treated. About 4 days per month she has acute diarrha which lasts 12 hours.   She is on Creon for CPI.  It helps with abdominal cramping and diarrhea urgency.   7/25/24: Patient presents for a follow up to discuss ongoing nausea, poorly controlled heartburn and now RLQ pain and constipation. She recently saw a GYN and had an ultrasound but they found no abnormalities in her pelvis such as fluid/inflammation or ovarian issues. She has a history of hysterectomy, denies new vaginal bleeding. The constipation is new over the past few weeks. She denies hematochezia or melena. States she now goes every 2 days as opposed to daily and she has to strain to have a BM. No fevers or chills. She has not tried any medications or diet changes. Her nausea is persistent, without vomiting. Initially zofran helped when taken daily prn, now she is noticing less of a difference. She is on famotidine and Creon. Creon keeps her from having severe symptoms but she has noticed no improvement in heartburn with famotidine. Remembers being on pantoprazole in the hospital after her bile leak, but she does not remember if this was helpful. She did have an EGD and colonoscopy in June of 2023 with Dr. Alston. EGD was without abnormalities and she was negative for H pylori at the time.  She denies weight loss, dysphagia, odynophagia, anorexia. Recent labs and imaging done at [place] were wnl- CT A/P from May reviewed and no acute changes were noted.  Today, patient reports she was recently dx with COPD. She notes she was taken off of Eliquis July 2024 which she was previously on due to a hx of DVT and PE (2022). Patient reports last week she developed diarrhea and abdominal cramping with anything she ate. She notes her symptoms have resolved since but c/o significant belching and bloating. She occasionally deveops reflux. She is c/w omeprazole daily. Denies nausea, vomiting, dysphagia, or odynophagia. She is c/w Creon. She has a complete BM every three days. Denies constitutional symptoms, hematochezia, or melena.

## 2025-01-31 LAB — H PYLORI BREATH TEST: NOT DETECTED

## 2025-02-10 ENCOUNTER — TELEPHONE ENCOUNTER (OUTPATIENT)
Dept: URBAN - METROPOLITAN AREA CLINIC 92 | Facility: CLINIC | Age: 60
End: 2025-02-10

## 2025-03-12 ENCOUNTER — TELEPHONE ENCOUNTER (OUTPATIENT)
Dept: URBAN - METROPOLITAN AREA CLINIC 92 | Facility: CLINIC | Age: 60
End: 2025-03-12

## 2025-03-12 ENCOUNTER — OFFICE VISIT (OUTPATIENT)
Dept: URBAN - METROPOLITAN AREA CLINIC 92 | Facility: CLINIC | Age: 60
End: 2025-03-12
Payer: COMMERCIAL

## 2025-03-12 VITALS
TEMPERATURE: 97 F | DIASTOLIC BLOOD PRESSURE: 79 MMHG | SYSTOLIC BLOOD PRESSURE: 119 MMHG | BODY MASS INDEX: 35.08 KG/M2 | HEIGHT: 71 IN | WEIGHT: 250.6 LBS | HEART RATE: 78 BPM

## 2025-03-12 DIAGNOSIS — K21.9 GERD: ICD-10-CM

## 2025-03-12 DIAGNOSIS — R10.31 RLQ DISCOMFORT: ICD-10-CM

## 2025-03-12 DIAGNOSIS — K59.01 SLOW TRANSIT CONSTIPATION: ICD-10-CM

## 2025-03-12 DIAGNOSIS — Z86.711 HISTORY OF PULMONARY ARTERY THROMBOSIS: ICD-10-CM

## 2025-03-12 DIAGNOSIS — K76.0 FATTY LIVER: ICD-10-CM

## 2025-03-12 DIAGNOSIS — R14.0 BLOATING: ICD-10-CM

## 2025-03-12 DIAGNOSIS — R11.2 NAUSEA AND VOMITING, UNSPECIFIED VOMITING TYPE: ICD-10-CM

## 2025-03-12 DIAGNOSIS — K86.89 PANCREATIC INSUFFICIENCY: ICD-10-CM

## 2025-03-12 PROBLEM — 235595009: Status: ACTIVE | Noted: 2025-03-12

## 2025-03-12 PROCEDURE — 99213 OFFICE O/P EST LOW 20 MIN: CPT

## 2025-03-12 RX ORDER — FAMOTIDINE 40 MG/1
1 TABLET AT BEDTIME TABLET, FILM COATED ORAL ONCE A DAY
Qty: 30 | Refills: 11 | Status: ACTIVE | COMMUNITY
Start: 2024-05-14

## 2025-03-12 RX ORDER — ONDANSETRON 4 MG/1
1 TABLET ON THE TONGUE AND ALLOW TO DISSOLVE TABLET, ORALLY DISINTEGRATING ORAL
Qty: 180 | Refills: 1 | Status: DISCONTINUED | COMMUNITY

## 2025-03-12 RX ORDER — IBUPROFEN 600 MG/1
1 TABLET WITH FOOD OR MILK AS NEEDED TABLET, FILM COATED ORAL THREE TIMES A DAY
Status: DISCONTINUED | COMMUNITY

## 2025-03-12 RX ORDER — OMEPRAZOLE 40 MG/1
1 CAPSULE 30 MINUTES BEFORE MORNING MEAL CAPSULE, DELAYED RELEASE ORAL ONCE A DAY
Qty: 90 | Refills: 1 | Status: ACTIVE | COMMUNITY
Start: 2024-07-25

## 2025-03-12 RX ORDER — PANTOPRAZOLE SODIUM 40 MG/1
1 TABLET 1/2 TO 1 HOUR BEFORE MORNING MEAL TABLET, DELAYED RELEASE ORAL ONCE A DAY
Qty: 90 TABLET | Refills: 3 | OUTPATIENT
Start: 2025-03-12

## 2025-03-12 RX ORDER — PANCRELIPASE 36000; 180000; 114000 [USP'U]/1; [USP'U]/1; [USP'U]/1
3 TABLETS WITH MEALS CAPSULE, DELAYED RELEASE PELLETS ORAL
Qty: 800 | Refills: 2 | Status: ACTIVE | COMMUNITY
Start: 2024-09-10 | End: 2025-06-07

## 2025-03-12 RX ORDER — SCOPOLAMINE 1 MG/3D
1 PATCH TO SKIN BEHIND THE EAR AS NEEDED PATCH TRANSDERMAL
Qty: 10 PATCH | Refills: 1 | Status: ACTIVE | COMMUNITY
Start: 2025-01-29 | End: 2025-03-30

## 2025-03-12 RX ORDER — SCOPOLAMINE 1 MG/3D
1 PATCH TO SKIN BEHIND THE EAR AS NEEDED PATCH TRANSDERMAL
Qty: 10 PATCH | Refills: 1
Start: 2025-01-29 | End: 2025-05-11

## 2025-03-12 RX ORDER — TIOTROPIUM BROMIDE 18 UG/1
1 CAPSULE BY INHALING THE CONTENTS OF THE CAPSULE USING THE HANDIHALER DEVICE CAPSULE ORAL; RESPIRATORY (INHALATION) ONCE A DAY
Status: ACTIVE | COMMUNITY

## 2025-03-12 RX ORDER — BUDESONIDE, GLYCOPYRROLATE, AND FORMOTEROL FUMARATE 160; 9; 4.8 UG/1; UG/1; UG/1
2 PUFFS AEROSOL, METERED RESPIRATORY (INHALATION) TWICE A DAY
Status: DISCONTINUED | COMMUNITY

## 2025-03-12 RX ORDER — VONOPRAZAN FUMARATE 13.36 MG/1
1 TABLET TABLET ORAL ONCE A DAY
Qty: 90 TABLET | Refills: 3 | OUTPATIENT
Start: 2025-03-12 | End: 2026-03-06

## 2025-03-12 NOTE — HPI-TODAY'S VISIT:
Patient is a 59 year old female with a PMH of COPD, DVT, PE, EPI who presents in follow up. Previously seen by EBEN Rausch   5/14/24: This is a 59 yo female with a PMHx of pancreatic insufficiency here for follow up.   The patient was admitted 24 hours fro observation to [facility] last week.  She had significant vomiting in association with a headache.  Intractible vomiting lasted for about 24 hours.  She was treated with anti emetics..  She had a negative CT of the head, negative EEG.   She was discharged on metoclopromide.   She is on pantoprazole which was placed after her gall bladder was removed.  She presumed she had to continue the medication indefinitely.  She admits to anxiet but has never been treated. About 4 days per month she has acute diarrha which lasts 12 hours.   She is on Creon for CPI.  It helps with abdominal cramping and diarrhea urgency.   7/25/24: Patient presents for a follow up to discuss ongoing nausea, poorly controlled heartburn and now RLQ pain and constipation. She recently saw a GYN and had an ultrasound but they found no abnormalities in her pelvis such as fluid/inflammation or ovarian issues. She has a history of hysterectomy, denies new vaginal bleeding. The constipation is new over the past few weeks. She denies hematochezia or melena. States she now goes every 2 days as opposed to daily and she has to strain to have a BM. No fevers or chills. She has not tried any medications or diet changes. Her nausea is persistent, without vomiting. Initially zofran helped when taken daily prn, now she is noticing less of a difference. She is on famotidine and Creon. Creon keeps her from having severe symptoms but she has noticed no improvement in heartburn with famotidine. Remembers being on pantoprazole in the hospital after her bile leak, but she does not remember if this was helpful. She did have an EGD and colonoscopy in June of 2023 with Dr. Alston. EGD was without abnormalities and she was negative for H pylori at the time.  She denies weight loss, dysphagia, odynophagia, anorexia. Recent labs and imaging done at [place] were wnl- CT A/P from May reviewed and no acute changes were noted.  1/29/25, patient reports she was recently dx with COPD. She notes she was taken off of Eliquis July 2024 which she was previously on due to a hx of DVT and PE (2022). Patient reports last week she developed diarrhea and abdominal cramping with anything she ate. She notes her symptoms have resolved since but c/o significant belching and bloating. She occasionally deveops reflux. She is c/w omeprazole daily. Denies nausea, vomiting, dysphagia, or odynophagia. She is c/w Creon. She has a complete BM every three days. Denies constitutional symptoms, hematochezia, or melena.  Today, patient reports she had a terrible episode of heartburn and epigastric cramping last night after eating pasta shirley. She did not lie down after eating Denies being lactose intolerant. She is c/w omeprazole QD and famotidine QHS but seem to be of limited benefit. She does continue to experience bloating. Her H.pylori breath test was negative. Her insurance company did not cover the SIBO test. Patient notes she does not exercise. She typically has a daily BM without benefit.

## 2025-05-14 ENCOUNTER — OFFICE VISIT (OUTPATIENT)
Dept: URBAN - METROPOLITAN AREA CLINIC 92 | Facility: CLINIC | Age: 60
End: 2025-05-14
Payer: COMMERCIAL

## 2025-05-14 DIAGNOSIS — K76.0 FATTY LIVER: ICD-10-CM

## 2025-05-14 DIAGNOSIS — K86.89 PANCREATIC INSUFFICIENCY: ICD-10-CM

## 2025-05-14 DIAGNOSIS — R10.31 RLQ DISCOMFORT: ICD-10-CM

## 2025-05-14 DIAGNOSIS — K21.9 GERD: ICD-10-CM

## 2025-05-14 DIAGNOSIS — R15.1 FECAL SMEARING: ICD-10-CM

## 2025-05-14 DIAGNOSIS — R11.2 NAUSEA AND VOMITING, UNSPECIFIED VOMITING TYPE: ICD-10-CM

## 2025-05-14 DIAGNOSIS — Z86.711 HISTORY OF PULMONARY ARTERY THROMBOSIS: ICD-10-CM

## 2025-05-14 DIAGNOSIS — R14.0 BLOATING: ICD-10-CM

## 2025-05-14 PROCEDURE — 99213 OFFICE O/P EST LOW 20 MIN: CPT

## 2025-05-14 RX ORDER — FAMOTIDINE 40 MG/1
1 TABLET AT BEDTIME TABLET, FILM COATED ORAL ONCE A DAY
Qty: 30 | Refills: 11 | Status: ACTIVE | COMMUNITY
Start: 2024-05-14

## 2025-05-14 RX ORDER — TIOTROPIUM BROMIDE 18 UG/1
1 CAPSULE BY INHALING THE CONTENTS OF THE CAPSULE USING THE HANDIHALER DEVICE CAPSULE ORAL; RESPIRATORY (INHALATION) ONCE A DAY
Status: ACTIVE | COMMUNITY

## 2025-05-14 RX ORDER — PANTOPRAZOLE SODIUM 40 MG/1
1 TABLET 1/2 TO 1 HOUR BEFORE MORNING MEAL TABLET, DELAYED RELEASE ORAL ONCE A DAY
Qty: 90 TABLET | Refills: 3 | Status: DISCONTINUED | COMMUNITY
Start: 2025-03-12

## 2025-05-14 RX ORDER — OMEPRAZOLE 40 MG/1
1 CAPSULE 30 MINUTES BEFORE MORNING MEAL CAPSULE, DELAYED RELEASE ORAL ONCE A DAY
Qty: 90 | Refills: 1 | Status: ACTIVE | COMMUNITY
Start: 2024-07-25

## 2025-05-14 RX ORDER — ONDANSETRON 8 MG/1
1 TABLET ON THE TONGUE AND ALLOW TO DISSOLVE TABLET, ORALLY DISINTEGRATING ORAL
Qty: 90 TABLET | Refills: 3 | OUTPATIENT
Start: 2025-05-14

## 2025-05-14 RX ORDER — PANCRELIPASE 36000; 180000; 114000 [USP'U]/1; [USP'U]/1; [USP'U]/1
3 TABLETS WITH MEALS CAPSULE, DELAYED RELEASE PELLETS ORAL
Qty: 800 | Refills: 2 | Status: ACTIVE | COMMUNITY
Start: 2024-09-10 | End: 2025-06-07

## 2025-05-14 RX ORDER — VONOPRAZAN FUMARATE 13.36 MG/1
1 TABLET TABLET ORAL ONCE A DAY
Qty: 90 TABLET | Refills: 3 | OUTPATIENT
Start: 2025-05-14 | End: 2026-05-09

## 2025-05-14 NOTE — HPI-TODAY'S VISIT:
Patient is a 59 year old female with a PMH of COPD, DVT, PE, EPI who presents in follow up. Previously seen by EBEN Rausch   5/14/24: This is a 57 yo female with a PMHx of pancreatic insufficiency here for follow up.   The patient was admitted 24 hours fro observation to [facility] last week.  She had significant vomiting in association with a headache.  Intractible vomiting lasted for about 24 hours.  She was treated with anti emetics..  She had a negative CT of the head, negative EEG.   She was discharged on metoclopromide.   She is on pantoprazole which was placed after her gall bladder was removed.  She presumed she had to continue the medication indefinitely.  She admits to anxiet but has never been treated. About 4 days per month she has acute diarrha which lasts 12 hours.   She is on Creon for CPI.  It helps with abdominal cramping and diarrhea urgency.   7/25/24: Patient presents for a follow up to discuss ongoing nausea, poorly controlled heartburn and now RLQ pain and constipation. She recently saw a GYN and had an ultrasound but they found no abnormalities in her pelvis such as fluid/inflammation or ovarian issues. She has a history of hysterectomy, denies new vaginal bleeding. The constipation is new over the past few weeks. She denies hematochezia or melena. States she now goes every 2 days as opposed to daily and she has to strain to have a BM. No fevers or chills. She has not tried any medications or diet changes. Her nausea is persistent, without vomiting. Initially zofran helped when taken daily prn, now she is noticing less of a difference. She is on famotidine and Creon. Creon keeps her from having severe symptoms but she has noticed no improvement in heartburn with famotidine. Remembers being on pantoprazole in the hospital after her bile leak, but she does not remember if this was helpful. She did have an EGD and colonoscopy in June of 2023 with Dr. Alston. EGD was without abnormalities and she was negative for H pylori at the time.  She denies weight loss, dysphagia, odynophagia, anorexia. Recent labs and imaging done at [place] were wnl- CT A/P from May reviewed and no acute changes were noted.  1/29/25, patient reports she was recently dx with COPD. She notes she was taken off of Eliquis July 2024 which she was previously on due to a hx of DVT and PE (2022). Patient reports last week she developed diarrhea and abdominal cramping with anything she ate. She notes her symptoms have resolved since but c/o significant belching and bloating. She occasionally deveops reflux. She is c/w omeprazole daily. Denies nausea, vomiting, dysphagia, or odynophagia. She is c/w Creon. She has a complete BM every three days. Denies constitutional symptoms, hematochezia, or melena.  3/12/25, patient reports she had a terrible episode of heartburn and epigastric cramping last night after eating pasta shirley. She did not lie down after eating Denies being lactose intolerant. She is c/w omeprazole QD and famotidine QHS but seem to be of limited benefit. She does continue to experience bloating. Her H.pylori breath test was negative. Her insurance company did not cover the SIBO test. Patient notes she does not exercise. She typically has a daily BM without benefit.   Today, patient reports she continues to experience daily reflux despite omeprazole and famotidine daily. She states Voquezna samples worked well. Admits to occasional nausea. Denies dysphagia or odynophagia. Admits to BRBPR on the toilet tissue two days ago, prior to this episode she had looser stools. Patient notes intermittent fecal incontinence, last episode occurred a week ago. She is c/w Creon and is taking it as indicated.

## 2025-06-03 ENCOUNTER — TELEPHONE ENCOUNTER (OUTPATIENT)
Dept: URBAN - METROPOLITAN AREA CLINIC 92 | Facility: CLINIC | Age: 60
End: 2025-06-03

## 2025-06-03 RX ORDER — PROMETHAZINE HYDROCHLORIDE 25 MG/1
1 SUPPOSITORY SUPPOSITORY RECTAL
Qty: 60 SUPPOSITORIES | Refills: 3 | OUTPATIENT
Start: 2025-06-03

## 2025-06-03 RX ORDER — FAMOTIDINE 40 MG/1
1 TABLET AT BEDTIME TABLET, FILM COATED ORAL ONCE A DAY
Qty: 90 TABLET | Refills: 5
Start: 2024-05-14

## 2025-06-25 ENCOUNTER — OFFICE VISIT (OUTPATIENT)
Dept: URBAN - METROPOLITAN AREA CLINIC 92 | Facility: CLINIC | Age: 60
End: 2025-06-25
Payer: COMMERCIAL

## 2025-06-25 DIAGNOSIS — R15.1 FECAL SMEARING: ICD-10-CM

## 2025-06-25 DIAGNOSIS — K86.89 PANCREATIC INSUFFICIENCY: ICD-10-CM

## 2025-06-25 DIAGNOSIS — K76.0 FATTY LIVER: ICD-10-CM

## 2025-06-25 DIAGNOSIS — R14.0 BLOATING: ICD-10-CM

## 2025-06-25 DIAGNOSIS — Z86.711 HISTORY OF PULMONARY ARTERY THROMBOSIS: ICD-10-CM

## 2025-06-25 DIAGNOSIS — K21.9 GERD: ICD-10-CM

## 2025-06-25 DIAGNOSIS — R10.31 RLQ DISCOMFORT: ICD-10-CM

## 2025-06-25 DIAGNOSIS — R11.2 NAUSEA AND VOMITING, UNSPECIFIED VOMITING TYPE: ICD-10-CM

## 2025-06-25 PROCEDURE — 99213 OFFICE O/P EST LOW 20 MIN: CPT

## 2025-06-25 RX ORDER — TIOTROPIUM BROMIDE 18 UG/1
1 CAPSULE BY INHALING THE CONTENTS OF THE CAPSULE USING THE HANDIHALER DEVICE CAPSULE ORAL; RESPIRATORY (INHALATION) ONCE A DAY
Status: ACTIVE | COMMUNITY

## 2025-06-25 RX ORDER — ONDANSETRON 8 MG/1
1 TABLET ON THE TONGUE AND ALLOW TO DISSOLVE TABLET, ORALLY DISINTEGRATING ORAL
Qty: 90 TABLET | Refills: 3 | Status: ACTIVE | COMMUNITY
Start: 2025-05-14

## 2025-06-25 RX ORDER — PANTOPRAZOLE SODIUM 40 MG/1
1 TABLET 1/2 TO 1 HOUR BEFORE MORNING MEAL TABLET, DELAYED RELEASE ORAL ONCE A DAY
Qty: 90 TABLET | Refills: 5 | OUTPATIENT
Start: 2025-06-25

## 2025-06-25 RX ORDER — FAMOTIDINE 40 MG/1
1 TABLET AT BEDTIME TABLET, FILM COATED ORAL ONCE A DAY
Qty: 90 TABLET | Refills: 5 | Status: ACTIVE | COMMUNITY
Start: 2024-05-14

## 2025-06-25 RX ORDER — PROMETHAZINE HYDROCHLORIDE 25 MG/1
1 SUPPOSITORY SUPPOSITORY RECTAL
Qty: 60 SUPPOSITORIES | Refills: 3 | Status: ACTIVE | COMMUNITY
Start: 2025-06-03

## 2025-06-25 RX ORDER — OMEPRAZOLE 40 MG/1
1 CAPSULE 30 MINUTES BEFORE MORNING MEAL CAPSULE, DELAYED RELEASE ORAL ONCE A DAY
Qty: 90 | Refills: 1 | Status: ACTIVE | COMMUNITY
Start: 2024-07-25

## 2025-06-25 RX ORDER — VONOPRAZAN FUMARATE 13.36 MG/1
1 TABLET TABLET ORAL ONCE A DAY
Qty: 90 TABLET | Refills: 3 | Status: ACTIVE | COMMUNITY
Start: 2025-05-14 | End: 2026-05-09

## 2025-06-25 NOTE — HPI-TODAY'S VISIT:
Patient is a 59 year old female with a PMH of COPD, DVT, PE, EPI who presents in follow up. Previously seen by EBEN Rausch   5/14/24: This is a 59 yo female with a PMHx of pancreatic insufficiency here for follow up.   The patient was admitted 24 hours fro observation to [facility] [place] last week.  She had significant vomiting in association with a headache.  Intractible vomiting lasted for about 24 hours.  She was treated with anti emetics..  She had a negative CT of the head, negative EEG.   She was discharged on metoclopromide.   She is on pantoprazole which was placed after her gall bladder was removed.  She presumed she had to continue the medication indefinitely.  She admits to anxiet but has never been treated. About 4 days per month she has acute diarrha which lasts 12 hours.   She is on Creon for CPI.  It helps with abdominal cramping and diarrhea urgency.   7/25/24: Patient presents for a follow up to discuss ongoing nausea, poorly controlled heartburn and now RLQ pain and constipation. She recently saw a GYN and had an ultrasound but they found no abnormalities in her pelvis such as fluid/inflammation or ovarian issues. She has a history of hysterectomy, denies new vaginal bleeding. The constipation is new over the past few weeks. She denies hematochezia or melena. States she now goes every 2 days as opposed to daily and she has to strain to have a BM. No fevers or chills. She has not tried any medications or diet changes. Her nausea is persistent, without vomiting. Initially zofran helped when taken daily prn, now she is noticing less of a difference. She is on famotidine and Creon. Creon keeps her from having severe symptoms but she has noticed no improvement in heartburn with famotidine. Remembers being on pantoprazole in the hospital after her bile leak, but she does not remember if this was helpful. She did have an EGD and colonoscopy in June of 2023 with Dr. Alston. EGD was without abnormalities and she was negative for H pylori at the time.  She denies weight loss, dysphagia, odynophagia, anorexia. Recent labs and imaging done at [place] were wnl- CT A/P from May reviewed and no acute changes were noted.  1/29/25, patient reports she was recently dx with COPD. She notes she was taken off of Eliquis July 2024 which she was previously on due to a hx of DVT and PE (2022). Patient reports last week she developed diarrhea and abdominal cramping with anything she ate. She notes her symptoms have resolved since but c/o significant belching and bloating. She occasionally deveops reflux. She is c/w omeprazole daily. Denies nausea, vomiting, dysphagia, or odynophagia. She is c/w Creon. She has a complete BM every three days. Denies constitutional symptoms, hematochezia, or melena.  3/12/25, patient reports she had a terrible episode of heartburn and epigastric cramping last night after eating pasta shirley. She did not lie down after eating Denies being lactose intolerant. She is c/w omeprazole QD and famotidine QHS but seem to be of limited benefit. She does continue to experience bloating. Her H.pylori breath test was negative. Her insurance company did not cover the SIBO test. Patient notes she does not exercise. She typically has a daily BM without benefit.   5/14/25, patient reports she continues to experience daily reflux despite omeprazole and famotidine daily. She states Voquezna samples worked well. Admits to occasional nausea. Denies dysphagia or odynophagia. Admits to BRBPR on the toilet tissue two days ago, prior to this episode she had looser stools. Patient notes intermittent fecal incontinence, last episode occurred a week ago. She is c/w Creon and is taking it as indicated.  Today, patient reports she has significant flares of reflux at least once weekly and occasional nausea but no vomiting. She is c/w omeprazole. Voquezna worked well but her insurance did not cover it. She is taking Benefiber daily which has improved her fecal incontinence she previously mentioned.

## 2025-08-20 ENCOUNTER — OFFICE VISIT (OUTPATIENT)
Dept: URBAN - METROPOLITAN AREA CLINIC 92 | Facility: CLINIC | Age: 60
End: 2025-08-20
Payer: COMMERCIAL

## 2025-08-20 DIAGNOSIS — Z86.711 HISTORY OF PULMONARY ARTERY THROMBOSIS: ICD-10-CM

## 2025-08-20 DIAGNOSIS — R10.31 RLQ DISCOMFORT: ICD-10-CM

## 2025-08-20 DIAGNOSIS — R19.7 ACUTE DIARRHEA: ICD-10-CM

## 2025-08-20 DIAGNOSIS — R14.0 BLOATING: ICD-10-CM

## 2025-08-20 DIAGNOSIS — R11.2 NAUSEA AND VOMITING, UNSPECIFIED VOMITING TYPE: ICD-10-CM

## 2025-08-20 DIAGNOSIS — K76.0 FATTY LIVER: ICD-10-CM

## 2025-08-20 DIAGNOSIS — R15.1 FECAL SMEARING: ICD-10-CM

## 2025-08-20 DIAGNOSIS — K21.9 GERD: ICD-10-CM

## 2025-08-20 DIAGNOSIS — K86.89 PANCREATIC INSUFFICIENCY: ICD-10-CM

## 2025-08-20 PROCEDURE — 99214 OFFICE O/P EST MOD 30 MIN: CPT

## 2025-08-20 RX ORDER — PANTOPRAZOLE SODIUM 40 MG/1
1 TABLET 1/2 TO 1 HOUR BEFORE MORNING MEAL TABLET, DELAYED RELEASE ORAL ONCE A DAY
Qty: 90 TABLET | Refills: 5 | Status: ACTIVE | COMMUNITY
Start: 2025-06-25

## 2025-08-20 RX ORDER — FAMOTIDINE 40 MG/1
1 TABLET AT BEDTIME TABLET, FILM COATED ORAL ONCE A DAY
Qty: 90 TABLET | Refills: 5 | Status: ACTIVE | COMMUNITY
Start: 2024-05-14

## 2025-08-20 RX ORDER — ONDANSETRON 8 MG/1
1 TABLET ON THE TONGUE AND ALLOW TO DISSOLVE TABLET, ORALLY DISINTEGRATING ORAL
Qty: 90 TABLET | Refills: 3 | Status: ACTIVE | COMMUNITY
Start: 2025-05-14

## 2025-08-20 RX ORDER — TIOTROPIUM BROMIDE 18 UG/1
1 CAPSULE BY INHALING THE CONTENTS OF THE CAPSULE USING THE HANDIHALER DEVICE CAPSULE ORAL; RESPIRATORY (INHALATION) ONCE A DAY
Status: ACTIVE | COMMUNITY

## 2025-08-20 RX ORDER — OMEPRAZOLE 40 MG/1
1 CAPSULE 30 MINUTES BEFORE MORNING MEAL CAPSULE, DELAYED RELEASE ORAL ONCE A DAY
Qty: 90 | Refills: 1 | Status: ACTIVE | COMMUNITY
Start: 2024-07-25

## 2025-08-20 RX ORDER — VONOPRAZAN FUMARATE 13.36 MG/1
1 TABLET TABLET ORAL ONCE A DAY
Qty: 90 TABLET | Refills: 3 | Status: ACTIVE | COMMUNITY
Start: 2025-05-14 | End: 2026-05-09

## 2025-08-20 RX ORDER — PROMETHAZINE HYDROCHLORIDE 25 MG/1
1 SUPPOSITORY SUPPOSITORY RECTAL
Qty: 60 SUPPOSITORIES | Refills: 3 | Status: ACTIVE | COMMUNITY
Start: 2025-06-03